# Patient Record
Sex: FEMALE | Race: WHITE
[De-identification: names, ages, dates, MRNs, and addresses within clinical notes are randomized per-mention and may not be internally consistent; named-entity substitution may affect disease eponyms.]

---

## 2017-01-29 NOTE — ED COUGH/URI
General


Chief Complaint:  Cough/Cold/Flu Symptoms


Stated Complaint:  FEVER/LETHARGIC/HA


Nursing Triage Note:  


Pt presents to ED with c/o fever, productive cough, body aches, nausea, and 


lethargy x 4 days, no f/u with PCP. Afebrile in triage. Last dose of Motrin at 


0930.


Source:  patient


Exam Limitations:  no limitations





History of Present Illness


Time seen by provider:  12:13


Initial Comments


To ER with general malaise, frontal headache, productive cough, body aches 

fever and lethargy.  This is been going on for 4 days.  Several family members 

with influenza recently.


Timing/Duration:  just prior to arrival


Severity/Quality:  moderate


Associated Symptoms:  cough





Allergies and Home Medications


Allergies


Coded Allergies:  


     No Known Drug Allergies (Unverified , 17)





Constitutional:   see HPI malaise weakness


EENTM:   see HPI


Respiratory:   see HPI cough


Cardiovascular:   no symptoms reported


Genitourinary:   no symptoms reported


Musculoskeletal:   no symptoms reported


Skin:   no symptoms reported


Psychiatric/Neurological:   No Symptoms Reported


Hematologic/Lymphatic:   No Symptoms Reported


Immunological/Allergic:   no symptoms reported





Past Medical-Social-Family Hx


Patient Social History


Alcohol Use:  Denies Use


Recreational Drug Use:  No


Smoking Status:  Current Everyday Smoker


Type Used:  Cigarettes


Recent Foreign Travel:  No


Contact w/Someone Who Travel:  No


Recent Infectious Disease Expo:  No


Recent Hopitalizations:  No


Physical Abuse Screen:  No


Sexual Abuse:  No





Seasonal Allergies


Seasonal Allergies:  No





Surgeries


HX Surgeries:  Yes (C/S x 4.)


Surgeries:   Section, Tonsillectomy





Respiratory


Hx Respiratory Disorders:  No





Cardiovascular


Hx Cardiac Disorders:  No





Neurological


Hx Neurological Disorders:  No





Reproductive System


Hx Reproductive Disorders:  No


Sexually Transmitted Disease:  No


HIV/AIDS:  No





Genitourinary


Hx Genitourinary Disorders:  No





Gastrointestinal


Hx Gastrointestinal Disorders:  No





Musculoskeletal


Hx Musculoskeletal Disorders:  No





Endocrine


Hx Endocrine Disorders:  No





HEENT


HX ENT Disorders:  No





Cancer


Hx Cancer:  No





Psychosocial


Hx Psychiatric Problems:  No





Integumentary


HX Skin/Integumentary Disorder:  No





Blood Transfusions


Hx Blood Disorders:  No


Adverse Reaction to a Blood Tr:  No





Physical Exam


Vital Signs





 Vital Sign - Last 12Hours








 17





 11:17


 


Temp 98.7


 


Pulse 102


 


Resp 20


 


B/P 94/57


 


Pulse Ox 96


 


O2 Delivery Room Air





Capillary Refill : Less Than 3 Seconds


General Appearance:   WD/WN no apparent distress


Eyes:  Bilateral Eye EOMI, Bilateral Eye Normal Inspection, Bilateral Eye PERRL


HEENT:   PERRL/EOMI normal ENT inspection


Neck:   non-tender full range of motion


Respiratory:   normal breath sounds no respiratory distress no accessory muscle 

use


Cardiovascular:   regular rate, rhythm no murmur


Gastrointestinal:   non tender soft


Extremities:   normal range of motion non-tender


Neurologic/Psychiatric:   alert normal mood/affect oriented x 3


Skin:   normal color warm/dry





Progress/Results/Core Measures


Results/Orders


Lab Results





 Laboratory Tests








Test


  17


11:50 Range/Units


 


 


Basophils # (Auto)


  0.0 


  0.0-0.1


10^3/uL


 


Basophils (%) (Auto) 1  0-10  %


 


Eosinophils # (Auto)


  0.1 


  0.0-0.3


10^3/uL


 


Eosinophils (%) (Auto) 2  0-10  %


 


Hematocrit 39  35-52  %


 


Hemoglobin 13.0  11.5-16.0  G/DL


 


Lymphocytes # (Auto) 1.2  1.0-4.0  X 10^3


 


Lymphocytes (%) (Auto) 21  12-44  %


 


Mean Corpuscular Hemoglobin 30  25-34  PG


 


Mean Corpuscular Hemoglobin


Concent 34 


  32-36  G/DL


 


 


Mean Corpuscular Volume 88  80-99  FL


 


Mean Platelet Volume 11.0 H 7.4-10.4  FL


 


Monocytes # (Auto) 0.5  0.0-1.0  X 10^3


 


Monocytes (%) (Auto) 9  0-12  %


 


Neutrophils # (Auto) 4.0  1.8-7.8  X 10^3


 


Neutrophils (%) (Auto) 68  42-75  %


 


Platelet Count


  245 


  130-400


10^3/uL


 


Red Blood Count


  4.39 


  4.35-5.85


10^6/uL


 


Red Cell Distribution Width 13.0  10.0-14.5  %


 


White Blood Count


  5.8 


  4.3-11.0


10^3/uL








Micro Results





 Microbiology


17 Influenza Types A,B Antigen ([facility]) - Final, Complete


          





My Orders





 Orders-NADYA JOHNSON


Influenza A And B Antigens (17 11:29)


Saline Lock/Iv-Start (17 11:36)


Cbc With Automated Diff (17 11:36)


Ns Iv 1000 Ml (Sodium Chloride 0.9%) (17 11:45)


Ketorolac Injection (Toradol Injection) (17 12:15)


Acetaminophen  Tablet (Tylenol  Tablet) (17 13:15)





Medications Given in ED








 Current Medications








 Medications  Dose


 Ordered  Sig/Demetrio


 Route  Start Time


 Stop Time Status Last Admin


Dose Admin


 


 Ketorolac


 Tromethamine  30 mg  ONCE  ONCE


 IVP  17 12:15


 17 12:16 DC 17 12:23


30 MG











Vital Signs/I&O





 Vital Sign - Last 12Hours








 17





 11:17


 


Temp 98.7


 


Pulse 102


 


Resp 20


 


B/P 94/57


 


Pulse Ox 96


 


O2 Delivery Room Air








Blood Pressure Mean:  69





Departure


Communication


Progress Notes


Patient states that her symptoms may have been more recent in onset than 4 

days.  States that she's been so focused on her children that she is not 

focused on herself.  She believes that she may be within the 48 hour window and 

would like to try Tamiflu.  I have handwritten a prescription for this.





Impression


Impression:  


 Primary Impression:  


 Influenza


Disposition:  01 HOME, SELF-CARE


Condition:  Stable





Departure-Patient Inst.


Decision time for Depature:  12:14


Referrals:  


NO,LOCAL PHYSICIAN (PCP)


Primary Care Physician


Patient Instructions:  Flu, Adult (DC)





Add. Discharge Instructions:


1.  Tylenol and Motrin for headache


2.  Return to ER for any concerns


All discharge instructions reviewed with patient and/or family. Voiced 

understanding.








NADYA JOHNSON 2017 12:15

## 2017-01-29 NOTE — XMS REPORT
MU2 Ambulatory Summary

 Created on: 2016



Sagrario Lance

External Reference #: [id number]

: 1985

Sex: Female



Demographics







 Address  [address]

[place]  [address]

 

 Home Phone  [phone]

 

 Preferred Language  English

 

 Marital Status  Never 

 

 Hindu Affiliation  Unknown

 

 Race  White

 

 Ethnic Group  Not  or 





Author







 Shauna King

 

 [facility]

 

 Address  [address]

[place], [place]  [id number]



 

 Phone  [phone]







Care Team Providers







 Care Team Member Name  Role  Phone

 

 BREANA Barkley  PCP  Unavailable







Allergies and Adverse Reactions







 Name  Reaction  Notes

 

 No known drug allergy      







Plan of Treatment







 Planned Activity  Comments  Planned Date  Planned Time  Plan/Goal

 

 HIV-1ANTIBODY     2016  12:00 AM   

 

 URINALYSIS AUTO W/SCOPE     2016  12:00 AM   

 

 OBSTETRIC PANEL     2016  12:00 AM   

 

 URINE PREGNANCY TEST     2016  12:00 AM   

 

 CHORIONIC GONADOTROPIN TEST     2016  12:00 AM   

 

 vaginal spotting            







Medications

Not available.



Problem List

Not available.



Vital Signs







 Date  Time  BP-Sys(mm[Hg]  BP-Bambi(mm[Hg])  HR(bpm)  RR(rpm)  Temp  WT  HT  HC  
BMI  BSA  BMI Percentile  O2 Sat(%)

 

 2016  8:51:00 AM  123 mmHg  66 mmHg  85 bpm     98.8 F  177 lbs  68 in   
  26.91 kg/m2  1.96 m2      

 

 2016  4:01:00 PM  122 mmHg  68 mmHg  81 bpm  20 rpm  98.8 F  176 lbs     
            99 %







Social History







 Name  Description  Comments

 

 Single      

 

 bachelors degree      

 

 Caffeine  Light   

 

 Alcohol  Never   

 

 Tobacco  Current every day smoker   







History of Procedures







 Date Ordered  Description  Order Status

 

 2016 9:00 AM  URINE PREGNANCY TEST  Reviewed

 

 2016 12:00 AM  CYTOPATH C/V THIN LAYER  Returned

 

 2016 12:00 AM  N.GONORRHOEAE DNA AMP PROB  Returned

 

 2016 12:00 AM  CHLAMYDIA CULTURE  Returned

 

 2016 12:00 AM  CHORIONIC GONADOTROPIN TEST  Returned

 

 2016 12:00 AM  Type and screen  Returned







Results Summary







 Data and Description  Results

 

 2016 9:00 AM  Pregnancy Test, Urine positive 

 

 2016 10:05 AM  BETA HCG QUANT 01935.0 mIU/mL

 

 2016 6:00 PM  BETA HCG QUANT >17134 mIU/mLBETA HCG QUANT 17382.0 mIU/
mLBETA HCG QUANT 38570.0 mIU/mL







History Of Immunizations

Not available.



History of Past Illness







 Name  Date of Onset  Comments

 

 No significant medical history      

 

 Vaginal spotting  2016  4:06PM   

 

 Pregnancy test confirmed positive  2016  9:00AM   

 

 , Threatened  2016  9:00AM   

 

 , Threatened  2016  9:13AM   







Payers

Not available.



History of Encounters







 Visit Date  Visit Type  Provider

 

 2016  Office visit  Shauna Barkley APRN

 

 2016  Office visit  Gloria Jackson APRN

## 2017-11-13 NOTE — DIAGNOSTIC IMAGING REPORT
INDICATION: Fetal survey.



TECHNIQUE: Multiple real-time grayscale images were obtained over

the gravid uterus.



COMPARISON: None



FINDINGS: Fetal heart rate is 136 beats per minute. The placenta

is anterior. No placenta previa.



The cervix is 5.1 cm in length and appears closed.



The spine, posterior fossa, the lateral ventricles, the stomach,

the bladder, two umbilical arteries, and the kidneys appear

unremarkable. The four-chamber view and cord insertion are not

well seen due to fetal position.



Biometrical measurements are as follows:

Biparietal 6.26 cm, age 25 weeks 3 days.

Head circumference 23.16 cm, age 25 weeks 2 days.

Abdominal circumference 21.52 cm, age 26 weeks 1 days.

Femur length 4.63 cm, age 25 weeks 3 days.



Sonographic estimate age: 25 weeks 4 days.

Sonographic estimated date of delivery: 2/22/2018.



Estimated Fetal Weight: 838 gm (+/- 122  gm).

LMP percentile: 77%.



Fetal heart rate: 136 beats per minute.



Fetal number: 1 of 1.



IMPRESSION: The cord insertion and four-chamber view are not seen

due to fetal position. Followup to reassess in two weeks is

suggested.



Dictated by: 



  Dictated on workstation # [id number]

## 2018-02-14 NOTE — DIAGNOSTIC IMAGING REPORT
INDICATION: Evaluate growth, amniotic fluid volume and fetal

presentation.



TECHNIQUE: Multiple real-time grayscale images were obtained over

the gravid uterus.



COMPARISON: 11/13/2017.



FINDINGS: There is a single live fetus in a transverse

presentation, head to the maternal right. The amniotic fluid

index is 15.6 cm. The placenta is anterior. Fetal heart rate was

recorded at 149 beats per minute. Biometry measurements are

consistent with approximately 37-38 weeks gestational age,

demonstrating normal interval growth.



Biometrical measurements are as follows:

Biparietal 9.0 cm, age 36 weeks 5 days.

Head circumference 32.6 cm, age 37 weeks 1 days.

Abdominal circumference 34.0 cm, age 38 weeks 0 days.

Femur length 7.6 cm, age 38 weeks 5 days.



Sonographic estimate age: 37 weeks 5 days.

Sonographic estimated date of delivery: 03/02/18.



Estimated Fetal Weight: 3317 gm (+/- 484  gm).

LMP percentile: 55%.



Fetal heart rate: 149 beats per minute.



Fetal number: 1 of 1.



IMPRESSION: Single live IUP approximately 37-38 weeks gestational

age showing normal interval growth when compared with prior

ultrasound from November 2017. The fetus is transverse, head to

maternal right.



Dictated by: 



  Dictated on workstation # [id number]

## 2018-02-20 NOTE — PROGRESS NOTE-PRE OPERATIVE
Pre-Operative Progress Note


H&P Reviewed


The H&P was reviewed, patient examined and no changes noted.


Date Seen by Provider:  2018


Time Seen by Provider:  08:55


Date H&P Reviewed:  2018


Time H&P Reviewed:  07:00


Pre-Operative Diagnosis:  Previous  x 4, desire to reduce risk of 

ovarian cancer











ANIL PIERCE DO 2018 09:46

## 2018-02-20 NOTE — CESAREAN SECTION OPERATIVE
Procedure


 Procedure Note


Pre-operative Diagnosis: Sagrario Lance  is a 32  /Para   3014 ,

Gestational Age 39 2/7 weeks with history of previous  section, 

malpresentation, possible labor, desires reduction in risk of ovarian cancer


Post-operative Diagnosis: same, omental adhesions


Procedure: Repeat low transverse  section, risk reduction salpingectomy

, lysis of omental adhesions


Physician: ANIL PIERCE 


Assistant: ELBERT Aiken, assistant necessary for retraction of important 

structures


Estimated blood loss:  700 mL


Disposition:  stable





Findings:


Viable female infant, Apgars 8/9, weight 7#13oz, intact placenta, 3vc, normal 

appearing uterus, tubes, and ovaries.





Indications:Sagrario Lance  is a 32  /Para   3014 ,Gestational Age 39 2/7 

weeks with history of previous  section, malpresentation, possible labor

, desires reduction in risk of ovarian cancer





Procedure Details:


The patient was seen in pre-op and the procedure was discussed with the patient 

in full, including the risks, benefits, and alternatives. All questions were 

answered. The patient was taken to the operating room and a time out was 

performed, verifying patient and procedure.


After spinal anesthesia was placed by our anesthesia colleagues, the patient 

was placed in the dorsal supine with leftward tilt for uterine displacement.~ 

Her abdomen was then prepped and draped in the typical sterile fashion. A 

Pfannenstiel skin incision was made using a scalpel and carried down through 

the underlying fascia. The fascia was incised in the midline and tented up 

using Kocher clamps. On both the inferior and superior fascia side the rectus 

muscle was dissected off bluntly and sharply using Glez scissors. The 

peritoneum was identified and entered bluntly in the midline. This was then 

stretched laterally using manual strength. After entering the abdominal cavity 

it was determined that there were extensive omental adhesions to the anterior 

abdominal wall, through the peritoneum to the anterior fascia.  These had to be 

taken down with cautery prior to the  section.  This took an additional 

7 minutes.  Then once I confirmed additional intraperitoneal adhesions, an extra

-large Armani retractor was placed and the lower uterine segment was 

visualized. A bladder flap was created with the use of Metzenbaum scissors.  

The bladder flap was advanced over the lower uterine segment





A scalpel was utilized to make a low transverse uterine incision. Amniotomy was 

performed with an Allis clamp with return of clear fluid. The infant's head was 

grasped and brought to the level of the incision. Infant was immediately crying 

and vigorous.  Fundal pressure was applied and infant was delivered without 

difficulty. Mouth and nares were suctioned with bulb suction. After the 

umbilical cord was clamped and cut, the infant was handed off to the pediatric 

staff. A sample of cord blood was then obtained.





The placenta was delivered intact via uterine massage. The uterus was cleared 

of all clots and debris. The uterine incision was closed using 0 Vicryl in a 

running locked fashion. A second imbricated layer was placed using 0 Vicryl in 

a running fashion as well. The uterus was flexed forward and the posterior 

rectouterine space was inspected and cleared of all clots and debris. Again the 

hysterotomy site was examined and hemostasis was observed. The bilateral tubes 

and ovaries appeared normal. At this time a bilateral salpingectomy for risk 

reduction of ovarian cancer was completed.  I elevated each tube with the 

Princess clamps and then transected across the mesosalpinx with the LigaSure.  I 

then clamped at the cornual attachment of the uterus and  the tubes 

bilaterally from the uterus with the LigaSure.  I then insured no active 

bleeding and sent the tubes for pathology.  





The abdominal gutters were cleared of all clots and debris. A final check of 

the uterine incision showed it to be hemostatic. The peritoneum was closed 

using 3-0 Vicryl in a running fashion. The fascia was closed with 0 Vicryl in a 

running fashion. The subcutaneous space was hemostatic, and irrigated. The 

subcutaneous space was closed with 3-0 Vicryl in several single interrupted 

stitches. The skin was then closed using 4-0 Monocryl in a running subcuticular 

fashion. The skin edges were reapproximated together and were hemostatic. A 

pressure dressing was applied. All sponge, lap and needle counts were correct 

at the end of the procedure per nursing.





Vitals - Labs


Labs


Laboratory Tests


18 06:30: 


Urine Color YELLOW, Urine Clarity CLEAR, Urine pH 7, Urine Specific Gravity 

1.015L, Urine Protein NEGATIVE, Urine Glucose (UA) NEGATIVE, Urine Ketones 

NEGATIVE, Urine Nitrite NEGATIVE, Urine Bilirubin NEGATIVE, Urine Urobilinogen 

NORMAL, Urine Leukocyte Esterase 1+H, Urine RBC (Auto) NEGATIVE, Urine RBC NONE

, Urine WBC 5-10H, Urine Squamous Epithelial Cells 10-25H, Urine Crystals 

PRESENTH, Urine Amorphous Sediment FEW MENDEZ URATESH, Urine Bacteria FEWH, Urine 

Casts NONE, Urine Mucus NEGATIVE, Urine Culture Indicated YES


18 07:20: 


White Blood Count 8.2, Red Blood Count 3.57L, Hemoglobin 10.4L, Hematocrit 31L, 

Mean Corpuscular Volume 87, Mean Corpuscular Hemoglobin 29, Mean Corpuscular 

Hemoglobin Concent 33, Red Cell Distribution Width 13.5, Platelet Count 254, 

Mean Platelet Volume 11.5H, Neutrophils (%) (Auto) 67, Lymphocytes (%) (Auto) 26

, Monocytes (%) (Auto) 6, Eosinophils (%) (Auto) 1, Basophils (%) (Auto) 0, 

Neutrophils # (Auto) 5.5, Lymphocytes # (Auto) 2.1, Monocytes # (Auto) 0.5, 

Eosinophils # (Auto) 0.1, Basophils # (Auto) 0.0











ANIL PIERCE DO 2018 10:41

## 2018-02-21 NOTE — POSTPARTUM PROGRESS NOTE
Post Op


LAte entry.  Seen at 0900]Post-operative Day #1 s/p RLTCS RR salpingectomy





Subjective:


Patient is without complaints. Ambulating, voiding after alfonso removed. 

Tolerating a regular diet without nausea or vomiting. Normal lochia. Pain is 

well controlled with oral pain medications. Passing flatus.  


Objective:





Laboratory Tests








Test


  18


05:46 Range/Units


 


 


White Blood Count


  11.9 H


  4.3-11.0


10^3/uL


 


Red Blood Count


  3.19 L


  4.35-5.85


10^6/uL


 


Hemoglobin 9.5 L 11.5-16.0  G/DL


 


Hematocrit 28 L 35-52  %


 


Mean Corpuscular Volume 89  80-99  FL


 


Mean Corpuscular Hemoglobin 30  25-34  PG


 


Mean Corpuscular Hemoglobin


Concent 34 


  32-36  G/DL


 


 


Red Cell Distribution Width 13.9  10.0-14.5  %


 


Platelet Count


  196 


  130-400


10^3/uL


 


Mean Platelet Volume 11.3 H 7.4-10.4  FL


 


Neutrophils (%) (Auto) 78 H 42-75  %


 


Lymphocytes (%) (Auto) 16  12-44  %


 


Monocytes (%) (Auto) 5  0-12  %


 


Eosinophils (%) (Auto) 1  0-10  %


 


Basophils (%) (Auto) 0  0-10  %


 


Neutrophils # (Auto) 9.3 H 1.8-7.8  X 10^3


 


Lymphocytes # (Auto) 1.9  1.0-4.0  X 10^3


 


Monocytes # (Auto) 0.6  0.0-1.0  X 10^3


 


Eosinophils # (Auto)


  0.1 


  0.0-0.3


10^3/uL


 


Basophils # (Auto)


  0.0 


  0.0-0.1


10^3/uL











Vital Sign - Last 12Hours








 18





 11:19 16:00


 


Temp 97.3 97.3


 


Pulse 89 84


 


Resp 20 16


 


B/P (MAP) 118/68 (85) 120/70 (87)


 


Pulse Ox 99 98


 


O2 Delivery Room Air Room Air














Intake and Output 


 


 18





 00:00


 


Intake Total 2747 ml


 


Output Total 1075 ml


 


Balance 1672 ml








Physical Exam:


General - Alert and oriented, no apparent distress


Abdomen - Soft, appropriately tender to palpation, non-distended, fundus firm 

at umbilicus


Incision - clean, dry and intact; no erythema or induration, no drainage


Extremities - no edema, negative Nayely's bilaterally





Assessment:


1. post-operative day # 1, status post RLTCS, RRS.


Recovering well, hemodynamically stable


2.  Acute blood loss anemia








Plan:


Routine post-operative care.


Encourage breast feeding.


Encourage ambulation.


VTE prophylaxis:  SCDs.


Ferrous sulfate supplementation.


Plan for discharge tomorrow





Vitals - Labs


Vital Signs - I&O





Vital Signs








  Date Time  Temp Pulse Resp B/P (MAP) Pulse Ox O2 Delivery O2 Flow Rate FiO2


 


18 16:00 97.3 84 16 120/70 (87) 98 Room Air  


 


18 11:19 97.3 89 20 118/68 (85) 99 Room Air  


 


18 08:25 97.0 83 20 117/70 (86) 98 Room Air  


 


18 08:24      Room Air  


 


18 04:22 96.8 79 16 104/64 (77) 96 Room Air  


 


18 00:59 99.2 94 18 125/79 (94) 98 Room Air  














I & O 


 


 18





 07:00


 


Intake Total 6297 ml


 


Output Total 3375 ml


 


Balance 2922 ml











Labs


Laboratory Tests


18 05:46: 


White Blood Count 11.9H, Red Blood Count 3.19L, Hemoglobin 9.5L, Hematocrit 28L

, Mean Corpuscular Volume 89, Mean Corpuscular Hemoglobin 30, Mean Corpuscular 

Hemoglobin Concent 34, Red Cell Distribution Width 13.9, Platelet Count 196, 

Mean Platelet Volume 11.3H, Neutrophils (%) (Auto) 78H, Lymphocytes (%) (Auto) 

16, Monocytes (%) (Auto) 5, Eosinophils (%) (Auto) 1, Basophils (%) (Auto) 0, 

Neutrophils # (Auto) 9.3H, Lymphocytes # (Auto) 1.9, Monocytes # (Auto) 0.6, 

Eosinophils # (Auto) 0.1, Basophils # (Auto) 0.0





Microbiology


18 Urine Culture - Preliminary, Resulted











ANIL PIERCE DO 2018 20:56

## 2018-02-21 NOTE — ANESTHESIA-REGIONAL POST-OP
Regional


Patient Condition


Mental Status:  Alert, Oriented x3


Circulation:  Same as Pre-Op


Headache:  Absent


Sensation:  Full Recovery


Motor Block:  Absent





Post Op Complications


Complications


None





Follow Up Care/Instructions


Patient Instructions


None needed.





Anesthesia/Patient Condition


Patient is doing well, no complaints, stable vital signs, no apparent adverse 

anesthesia problems.











KAIT MARTINEZ DO Feb 21, 2018 13:20

## 2018-02-22 NOTE — POSTPARTUM PROGRESS NOTE
Post Op


Post-operative Day #2 s/p RLTCS





Subjective:


Patient is without complaints. Ambulating, voiding after alfonso removed. 

Tolerating a regular diet without nausea or vomiting. Normal lochia. Pain is 

well controlled with oral pain medications. Passing flatus.  





Objective:





Vital Sign - Last 12Hours








 18





 23:00 06:54


 


Temp 97.2 98.2


 


Pulse 88 88


 


Resp 16 16


 


B/P (MAP) 124/73 (90) 120/77 (91)


 


Pulse Ox 97 98


 


O2 Delivery Room Air Room Air














Intake and Output 


 


 18





 00:00


 


Intake Total 1600 ml


 


Balance 1600 ml











Physical Exam:


General - Alert and oriented, no apparent distress


Abdomen - Soft, appropriately tender to palpation, non-distended, fundus firm 

at umbilicus


Incision - clean, dry and intact; no erythema or induration, no drainage


Extremities - no edema, negative Nayely's bilaterally








Assessment:


1. post-operative day # 2, status post RLTCS, RR salpingectomy.


Recovering well, hemodynamically stable


2.  Acute blood loss anemia - receiving iron








Plan:


Routine post-operative care.


Encourage breast feeding.


Encourage ambulation.


VTE prophylaxis:  SCDs.


Ferrous sulfate supplementation.


Plan for discharge today or tomorrow





Vitals - Labs


Vital Signs - I&O





Vital Signs








  Date Time  Temp Pulse Resp B/P (MAP) Pulse Ox O2 Delivery O2 Flow Rate FiO2


 


18 06:54 98.2 88 16 120/77 (91) 98 Room Air  


 


18 23:00 97.2 88 16 124/73 (90) 97 Room Air  


 


18 16:00 97.3 84 16 120/70 (87) 98 Room Air  


 


18 11:19 97.3 89 20 118/68 (85) 99 Room Air  














I & O 


 


 18





 07:00


 


Intake Total 2700 ml


 


Balance 2700 ml











Labs





Microbiology


18 Urine Culture - Preliminary, Resulted











ANIL PIERCE DO 2018 09:46

## 2018-02-22 NOTE — DISCHARGE INST-WOMEN'S SERVICE
Discharge Inst-Women's Serv


Depart Medication/Instructions


New, Converted or Re-Newed RX:  RX on Chart


Final Diagnosis


repeat  section


risk reduction of ovarian cancer


acute blood loss anemia





Consults/Follow Up


Additional Follow Up:  Yes (1 week for incision check joselin Mon/reggie, 6 weeks 

for pp exam)





Activity


Activity:  Activity as Tolerated


Driving Instructions:  No Driving for 1 Week


NO SMOKING:  NO SMOKING


Nothing Inside Vagina:  No Douching, No [employer], No Tampons





Diet


Discharge Diet:  No Restrictions


Symptoms to Report to :  Swelling Increased, Eyesight Changes, Fever Over 

101 Degrees F, Vaginal Bleeding Increase, Cramps in Feet or Legs, Vaginal 

Discharge Foul


For Any Problems or Questions:  Contact Your Physician





Skin/Wound Care


Infection Signs and Symptoms:  Increased Redness, Foul Odor of Wound, Increased 

Drainage, Skin Itchy or Has a Rash, Increased Swelling, Temperature Above 101  F


Operative Area Clean and Dry:  Keep Incision Clean/Dry


Stitches/Staples/Dermabond:  Dermabond


Bathing Instructions:  ANIL Freeman DO 2018 09:52

## 2018-12-19 ENCOUNTER — HOSPITAL ENCOUNTER (OUTPATIENT)
Dept: HOSPITAL 75 - RAD | Age: 33
End: 2018-12-19
Attending: OBSTETRICS & GYNECOLOGY
Payer: MEDICAID

## 2018-12-19 DIAGNOSIS — R20.0: ICD-10-CM

## 2018-12-19 DIAGNOSIS — R51: ICD-10-CM

## 2018-12-19 DIAGNOSIS — N39.490: Primary | ICD-10-CM

## 2018-12-19 DIAGNOSIS — R20.2: ICD-10-CM

## 2018-12-19 PROCEDURE — 70551 MRI BRAIN STEM W/O DYE: CPT

## 2018-12-19 NOTE — DIAGNOSTIC IMAGING REPORT
PROCEDURE: MR imaging of the brain without contrast.



TECHNIQUE: Multiplanar, multisequence MR imaging of the brain was

performed without contrast.



INDICATION: Facial and upper extremity paresthesia with lower

extremity numbness.



FINDINGS: Ventricles and sulci are within normal limits for size.

Gray and white matter signal intensities are unremarkable. There

is no restricted diffusion to indicate an infarct. There is no

abnormal mass effect or shift of midline structures. Fluid is

present within mastoid air cells, greater on the right.

Visualized paranasal sinuses are clear.



IMPRESSION: No acute intracranial abnormalities identified.

Mastoid air cells fluid is present, greater on the right.



Dictated by: 



  Dictated on workstation # [id number]

## 2018-12-21 ENCOUNTER — HOSPITAL ENCOUNTER (OUTPATIENT)
Dept: HOSPITAL 75 - RAD | Age: 33
End: 2018-12-21
Attending: OBSTETRICS & GYNECOLOGY
Payer: MEDICAID

## 2018-12-21 DIAGNOSIS — R20.0: Primary | ICD-10-CM

## 2018-12-21 DIAGNOSIS — R51: ICD-10-CM

## 2018-12-21 DIAGNOSIS — R20.2: ICD-10-CM

## 2018-12-21 PROCEDURE — 72141 MRI NECK SPINE W/O DYE: CPT

## 2018-12-21 PROCEDURE — 72157 MRI CHEST SPINE W/O & W/DYE: CPT

## 2018-12-21 RX ADMIN — GADOBUTROL ONE MMOL: 604.72 INJECTION INTRAVENOUS at 10:36

## 2018-12-21 NOTE — DIAGNOSTIC IMAGING REPORT
INDICATION: Bilateral arm leg numbness, facial, neck and tongue

numbness with head pain and loss of bladder control.



FINDINGS: Thoracic spinal cord has a normal volume and normal

morphology and normal signal intensity throughout. The spinal

canal is widely patent at each vertebral body and disc space

level with a good volume of CSF circumscribing the cord at all

levels. Few scattered fat-containing benign hemangiolipomata

noted. No suspicious marrow signal abnormality. The thoracic

vertebral body heights are maintained and their alignment is

anatomic. No paravertebral mass, hemorrhage or fluid collection

and no foraminal obstruction. Intervertebral discs are

well-hydrated and show no significant displacement.



IMPRESSION: Unremarkable MRI thoracic spine showed no stenosis or

cord pathology.



Dictated by: 



  Dictated on workstation # [id number]

## 2018-12-21 NOTE — DIAGNOSTIC IMAGING REPORT
PROCEDURE: MR imaging cervical spine without contrast.



TECHNIQUE: Multiplanar, multisequence MR imaging of the cervical

spine was performed without contrast.



INDICATION: Bilateral arm and leg numbness as well as facial,

neck and tongue numbness and headache.



No prior studies are available for comparison.



FINDINGS: There is some straightening of the normal cervical

lordotic curvature. Vertebral body marrow signal is normal. No

geographic marrow lesion is seen. There is fairly normal height

and signal intensity to the cervical intervertebral discs. The

cervical spinal cord demonstrates normal homogeneous signal

intensity and normal morphology. Central canal and neural

foramina are widely patent at all levels of the cervical spine.

No focal disc protrusion is seen. The paraspinous tissues are

unremarkable.



IMPRESSION: Unremarkable MRI of the cervical spine. No central

canal or neural foraminal stenosis is seen. No abnormal cord

signal is detected.



Dictated by: 



  Dictated on workstation # [id number]